# Patient Record
Sex: FEMALE | Race: WHITE | NOT HISPANIC OR LATINO | Employment: UNEMPLOYED | URBAN - NONMETROPOLITAN AREA
[De-identification: names, ages, dates, MRNs, and addresses within clinical notes are randomized per-mention and may not be internally consistent; named-entity substitution may affect disease eponyms.]

---
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
## 2025-03-17 ENCOUNTER — TELEPHONE (OUTPATIENT)
Dept: GASTROENTEROLOGY | Facility: CLINIC | Age: 20
End: 2025-03-17
Payer: COMMERCIAL

## 2025-03-17 NOTE — TELEPHONE ENCOUNTER
Called patient and advised colonoscopy would only rule out crohns.  Alejandrina add on a colon she would like to have a colon with EGD she is going to still have a referral sent over for colon.

## 2025-03-17 NOTE — TELEPHONE ENCOUNTER
PATIENT WANTING TO KNOW IF EGD CAN RULE OUT CHRON'S DISEASE, ALONE, OR DOES IT REQUIRE COLONOSCOPY AS WELL TO RULE IT OUT? ADVISED I WILL HAVE CLINICAL TEAM MEMBER CALL HER TO DISCUSS, AS I AM A  AND CANNOT GIVE CLINICAL ADVISE. PLEASE AND THANK YOU.

## 2025-03-19 ENCOUNTER — TELEPHONE (OUTPATIENT)
Dept: GASTROENTEROLOGY | Facility: CLINIC | Age: 20
End: 2025-03-19

## 2025-03-19 RX ORDER — SODIUM, POTASSIUM,MAG SULFATES 17.5-3.13G
1 SOLUTION, RECONSTITUTED, ORAL ORAL TAKE AS DIRECTED
Qty: 354 ML | Refills: 0 | Status: SHIPPED | OUTPATIENT
Start: 2025-03-19

## 2025-03-19 NOTE — TELEPHONE ENCOUNTER
Provider: DR ENRIQUE GANN     Caller: STEPHANIE MCDOWELL     Relationship to Patient: SELF    Pharmacy: [facility]     Phone Number: [phone]     Reason for Call: PT IS CALLING TO GET HER BOWEL PREP SENT IN TO [facility] FOR HER COLONOSCOPY ON 3/21/25

## 2025-03-21 ENCOUNTER — OUTSIDE FACILITY SERVICE (OUTPATIENT)
Dept: GASTROENTEROLOGY | Facility: CLINIC | Age: 20
End: 2025-03-21
Payer: COMMERCIAL

## 2025-03-21 PROCEDURE — 45385 COLONOSCOPY W/LESION REMOVAL: CPT | Performed by: INTERNAL MEDICINE

## 2025-03-21 PROCEDURE — 88305 TISSUE EXAM BY PATHOLOGIST: CPT | Performed by: INTERNAL MEDICINE

## 2025-03-21 RX ORDER — DICYCLOMINE HYDROCHLORIDE 10 MG/1
10 CAPSULE ORAL
Qty: 120 CAPSULE | Refills: 6 | Status: SHIPPED | OUTPATIENT
Start: 2025-03-21

## 2025-03-24 ENCOUNTER — LAB REQUISITION (OUTPATIENT)
Dept: LAB | Facility: HOSPITAL | Age: 20
End: 2025-03-24
Payer: COMMERCIAL

## 2025-03-24 DIAGNOSIS — K52.9 NONINFECTIVE GASTROENTERITIS AND COLITIS, UNSPECIFIED: ICD-10-CM

## 2025-03-24 DIAGNOSIS — K22.89 OTHER SPECIFIED DISEASE OF ESOPHAGUS: ICD-10-CM

## 2025-03-24 DIAGNOSIS — R10.13 EPIGASTRIC PAIN: ICD-10-CM

## 2025-03-25 LAB
CYTO UR: NORMAL
LAB AP CASE REPORT: NORMAL
LAB AP CLINICAL INFORMATION: NORMAL
LAB AP DIAGNOSIS COMMENT: NORMAL
PATH REPORT.FINAL DX SPEC: NORMAL
PATH REPORT.GROSS SPEC: NORMAL

## 2025-03-27 ENCOUNTER — RESULTS FOLLOW-UP (OUTPATIENT)
Dept: LAB | Facility: HOSPITAL | Age: 20
End: 2025-03-27
Payer: COMMERCIAL

## 2025-03-27 NOTE — LETTER
Shelbygina Gomez New  [address]  [place] [address]    March 31, 2025     Dear Ms. New:    Below are the results from your recent visit:    Resulted Orders   Tissue Pathology Exam   Result Value Ref Range    Case Report       Surgical Pathology Report                         Case: [id number]                                  Authorizing Provider:  Laverne Morel MD           Collected:           03/21/2025 02:23 PM          Ordering Location:     [facility]   Received:            03/24/2025 09:52 AM                                 LABORATORY                                                                   Pathologist:           Jostin Gonzalez MD                                                        Specimens:   1) - Small Intestine, Duodenum                                                                      2) - Gastric, Antrum                                                                                3) - Esophagus                                                                                      4) - Ileum, terminal                                                                                5) - Colon                                                                                 Clinical Information       Epigastric pain  Other specified disease of esophagus  Noninfective gastroenteritis and colitis, unspecified      Final Diagnosis       1.  DUODENUM BIOPSY:  Normal villous architecture with no significant histopathologic change.  No parasites noted.  No villous blunting or increased intraepithelial lymphocytes suggestive of celiac disease.    2.  GASTRIC ANTRUM BIOPSY:  Reactive gastropathic change.  Negative for intestinal metaplasia, dysplasia or significant inflammation.  No Helicobacter pylori like organisms identified on routine stains.    3.  ESOPHAGUS BIOPSY:  Squamous mucosa with basal layer hyperplasia and increased intraepithelial leukocytes including eosinophils (up to  "35 per high-power field).  Negative for intestinal metaplasia, dysplasia and glandular mucosa.  See comment.    4.  TERMINAL ILEUM BIOPSY:  Normal villous architecture with no significant histopathologic change.  No parasites noted.  No villous blunting or increased intraepithelial lymphocytes suggestive of celiac disease.    5.  RANDOM COLON BIOPSY:  Colonic mucosa with no significant histopathologic change.  No histologic features of acute colitis, chronic colitis, collagenous colitis or lymphocytic colitis identified.        Comment       The degree of eosinophilia raises the possibility of eosinophilic esophagitis although exuberant reflux could show similar features.  Correlation with clinical and endoscopic findings would be essential.      Gross Description       1. Small Intestine, Duodenum.  Received in formalin labeled \"duodenum\" are multiple tan soft tissue fragments aggregating 1.2 x 1 x 0.2 cm submitted entirely in a single cassette.    2. Gastric, Antrum.  Received in formalin labeled \"gastric antrum\" are 2 tan soft tissue fragments aggregating 0.4 x 0.4 x 0.2 cm submitted entirely in a single cassette.    3. Esophagus.  Received in formalin labeled \"esophagus\" are multiple white, ragged soft tissue fragments aggregating 1 x 1 x 0.2 cm submitted entirely in a single cassette.    4. Ileum, terminal.  Received in formalin labeled \"terminal ileum\" are a few tan soft tissue fragments aggregating 0.6 x 0.6 x 0.2 cm submitted entirely in a single cassette.    5. Colon.  Received in formalin labeled \"random colon\" are multiple tan soft tissue fragments aggregating 1 x 1 x 0.2 cm submitted entirely in a single cassette. HDM        Microscopic Description       The slides are reviewed and demonstrate histopathologic features supporting the above rendered diagnosis.           , You're pathology of her duodenum, antrum, esophagus, terminal ileum, and random colon did NOT show any specific disease.   This is " good news.  I recommend repeat colonoscopy at age 45.             Sincerely,        Laverne Morel MD

## 2025-06-24 ENCOUNTER — OFFICE VISIT (OUTPATIENT)
Dept: OBSTETRICS AND GYNECOLOGY | Facility: CLINIC | Age: 20
End: 2025-06-24
Payer: COMMERCIAL

## 2025-06-24 VITALS — WEIGHT: 148 LBS | RESPIRATION RATE: 14 BRPM | BODY MASS INDEX: 25.4 KG/M2

## 2025-06-24 DIAGNOSIS — N91.4 SECONDARY OLIGOMENORRHEA: Primary | ICD-10-CM

## 2025-06-24 DIAGNOSIS — R10.31 PAIN, ABDOMINAL, RLQ: ICD-10-CM

## 2025-06-24 DIAGNOSIS — R10.32 ABDOMINAL PAIN, LLQ: ICD-10-CM

## 2025-06-24 PROBLEM — S83.519A RUPTURE OF ANTERIOR CRUCIATE LIGAMENT: Status: RESOLVED | Noted: 2025-05-01 | Resolved: 2025-06-24

## 2025-06-24 PROBLEM — S83.519A RUPTURE OF ANTERIOR CRUCIATE LIGAMENT: Status: ACTIVE | Noted: 2025-01-08

## 2025-06-24 PROBLEM — K58.9 IRRITABLE BOWEL SYNDROME: Status: ACTIVE | Noted: 2022-01-01

## 2025-06-24 PROBLEM — S83.519A RUPTURE OF ANTERIOR CRUCIATE LIGAMENT: Status: ACTIVE | Noted: 2025-05-01

## 2025-06-24 PROCEDURE — 99214 OFFICE O/P EST MOD 30 MIN: CPT | Performed by: OBSTETRICS & GYNECOLOGY

## 2025-06-24 RX ORDER — SERTRALINE HYDROCHLORIDE 25 MG/1
TABLET, FILM COATED ORAL
COMMUNITY
End: 2025-06-24

## 2025-06-24 NOTE — PROGRESS NOTES
Subjective   Chief Complaint   Patient presents with    Menstrual Problem       Shelby Gudino is a 20 y.o. year old .  Patient's last menstrual period was 2025 (exact date).  She comes back in in follow-up.  When she was here most recently we did try her on her birth control pill.  She did not like the way it made her feel.  She has been off it for about 4 months now.  She is not having menstrual cycles.  She also is having pain in the lower quadrants bilaterally wrapping around to the back.  Has random cramps that are not real bad but the pain is worse when she menstruates.  She has had a longstanding history of alternating diarrhea and constipation dating back multiple years.  She has had a colonoscopy and they found nothing.    She has never had a transvaginal ultrasound to look into this.  She has been sexually active in the past but has not been sexually active for at least 1 year now.    The following portions of the patient's history were reviewed and updated as appropriate:current medications and allergies    Social History    Tobacco Use      Smoking status: Never      Smokeless tobacco: Never         Objective   Resp 14   Wt 67.1 kg (148 lb)   LMP 2025 (Exact Date)   BMI 25.40 kg/m²     Lab Review   No data reviewed    Imaging   No data reviewed        Assessment   Oligomenorrhea with dysmenorrhea impacting day-to-day function.  Bowel dysfunction which could potentially be explaining her pelvic pain     Plan   Ultrasound needs to be done any time that is convenient for her.   The importance of keeping all planned follow-up and taking all medications as prescribed was emphasized.  Follow up after ultrasound           This note was electronically signed.    Jaxson Patel M.D.  2025        Part of this note may be an electronic transcription/translation of spoken language to printed text using the Dragon Dictation System.

## 2025-06-24 NOTE — PROGRESS NOTES
Subjective   Chief Complaint   Patient presents with    Menstrual Problem     Shelby Gudino is a 20 y.o. year old  who comes to review her recent testing and discuss next steps.         Objective   Resp 14   Wt 67.1 kg (148 lb)   LMP 2025 (Exact Date)   BMI 25.40 kg/m²     Lab Review   No data reviewed    Imaging   Pelvic ultrasound images independantly reviewed - uterus is small and normal in size and shape.  Ovaries have a polycystic appearance bilaterally       Assessment   Pelvic pain of unclear etiology.  No sonographic explanation for her pain  Irregular menses with sonographic appearing polycystic ovaries bilaterally     Plan   I really think next step would be to see GI and consider food allergy testing to look into the causes for her pain.  I have offered laparoscopy to exclude endometriosis but I think given her age I think this is not likely to be present.  If the pain persists after seeing GI and doing a more thorough workup, would consider laparoscopy to evaluate for endometriosis  The importance of keeping all planned follow-up and taking all medications as prescribed was emphasized.  Follow up pending evaluation with GI     No orders of the defined types were placed in this encounter.             This note was electronically signed.    Jaxson Patel M.D.  2025    Part of this note may be an electronic transcription/translation of spoken language to printed text using the Dragon Dictation System.

## 2025-06-24 NOTE — Clinical Note
Laverne, She is having pelvic pain.  She has a normal ultrasound.  Her history is not highly suggestive of a gynecologic cause.  Her mother did have endometriosis at a young age so I cannot exclude that being present but I really think we need to try to get a better handle on her alternating diarrhea and constipation.  She is going to be seeing you in about 3 weeks time.  I told her I thought we needed to see if we can improve her bowel symptoms and see if her pelvic pain improves.

## 2025-08-12 ENCOUNTER — OFFICE VISIT (OUTPATIENT)
Age: 20
End: 2025-08-12
Payer: COMMERCIAL

## 2025-08-12 ENCOUNTER — LAB (OUTPATIENT)
Facility: HOSPITAL | Age: 20
End: 2025-08-12
Payer: COMMERCIAL

## 2025-08-12 VITALS
DIASTOLIC BLOOD PRESSURE: 68 MMHG | OXYGEN SATURATION: 98 % | WEIGHT: 144 LBS | HEIGHT: 66 IN | SYSTOLIC BLOOD PRESSURE: 98 MMHG | BODY MASS INDEX: 23.14 KG/M2 | HEART RATE: 63 BPM

## 2025-08-12 DIAGNOSIS — R10.13 EPIGASTRIC PAIN: Primary | ICD-10-CM

## 2025-08-12 DIAGNOSIS — R10.13 EPIGASTRIC PAIN: ICD-10-CM

## 2025-08-12 DIAGNOSIS — R19.7 DIARRHEA, UNSPECIFIED TYPE: ICD-10-CM

## 2025-08-12 DIAGNOSIS — R19.8 ALTERNATING CONSTIPATION AND DIARRHEA: ICD-10-CM

## 2025-08-12 DIAGNOSIS — R74.8 ELEVATED ALKALINE PHOSPHATASE LEVEL: ICD-10-CM

## 2025-08-12 LAB
ADV 40+41 DNA STL QL NAA+NON-PROBE: NOT DETECTED
ASTRO TYP 1-8 RNA STL QL NAA+NON-PROBE: NOT DETECTED
BASOPHILS # BLD AUTO: 0.08 10*3/MM3 (ref 0–0.2)
BASOPHILS NFR BLD AUTO: 1.1 % (ref 0–1.5)
C CAYETANENSIS DNA STL QL NAA+NON-PROBE: NOT DETECTED
C COLI+JEJ+UPSA DNA STL QL NAA+NON-PROBE: NOT DETECTED
C DIFF TOX GENS STL QL NAA+PROBE: NOT DETECTED
CRYPTOSP DNA STL QL NAA+NON-PROBE: NOT DETECTED
DEPRECATED RDW RBC AUTO: 40.5 FL (ref 37–54)
E HISTOLYT DNA STL QL NAA+NON-PROBE: NOT DETECTED
EAEC PAA PLAS AGGR+AATA ST NAA+NON-PRB: NOT DETECTED
EC STX1+STX2 GENES STL QL NAA+NON-PROBE: NOT DETECTED
EOSINOPHIL # BLD AUTO: 0.4 10*3/MM3 (ref 0–0.4)
EOSINOPHIL NFR BLD AUTO: 5.3 % (ref 0.3–6.2)
EPEC EAE GENE STL QL NAA+NON-PROBE: NOT DETECTED
ERYTHROCYTE [DISTWIDTH] IN BLOOD BY AUTOMATED COUNT: 11.8 % (ref 12.3–15.4)
ERYTHROCYTE [SEDIMENTATION RATE] IN BLOOD: 7 MM/HR (ref 0–20)
ETEC LTA+ST1A+ST1B TOX ST NAA+NON-PROBE: NOT DETECTED
G LAMBLIA DNA STL QL NAA+NON-PROBE: NOT DETECTED
HCT VFR BLD AUTO: 42.5 % (ref 34–46.6)
HGB BLD-MCNC: 14.3 G/DL (ref 12–15.9)
IMM GRANULOCYTES # BLD AUTO: 0.01 10*3/MM3 (ref 0–0.05)
IMM GRANULOCYTES NFR BLD AUTO: 0.1 % (ref 0–0.5)
LYMPHOCYTES # BLD AUTO: 2.97 10*3/MM3 (ref 0.7–3.1)
LYMPHOCYTES NFR BLD AUTO: 39.5 % (ref 19.6–45.3)
MCH RBC QN AUTO: 31.5 PG (ref 26.6–33)
MCHC RBC AUTO-ENTMCNC: 33.6 G/DL (ref 31.5–35.7)
MCV RBC AUTO: 93.6 FL (ref 79–97)
MONOCYTES # BLD AUTO: 0.43 10*3/MM3 (ref 0.1–0.9)
MONOCYTES NFR BLD AUTO: 5.7 % (ref 5–12)
NEUTROPHILS NFR BLD AUTO: 3.62 10*3/MM3 (ref 1.7–7)
NEUTROPHILS NFR BLD AUTO: 48.3 % (ref 42.7–76)
NOROVIRUS GI+II RNA STL QL NAA+NON-PROBE: NOT DETECTED
NRBC BLD AUTO-RTO: 0 /100 WBC (ref 0–0.2)
P SHIGELLOIDES DNA STL QL NAA+NON-PROBE: NOT DETECTED
PLATELET # BLD AUTO: 372 10*3/MM3 (ref 140–450)
PMV BLD AUTO: 10 FL (ref 6–12)
RBC # BLD AUTO: 4.54 10*6/MM3 (ref 3.77–5.28)
RVA RNA STL QL NAA+NON-PROBE: NOT DETECTED
S ENT+BONG DNA STL QL NAA+NON-PROBE: NOT DETECTED
SAPO I+II+IV+V RNA STL QL NAA+NON-PROBE: NOT DETECTED
SHIGELLA SP+EIEC IPAH ST NAA+NON-PROBE: NOT DETECTED
V CHOL+PARA+VUL DNA STL QL NAA+NON-PROBE: NOT DETECTED
V CHOLERAE DNA STL QL NAA+NON-PROBE: NOT DETECTED
WBC NRBC COR # BLD AUTO: 7.51 10*3/MM3 (ref 3.4–10.8)
Y ENTEROCOL DNA STL QL NAA+NON-PROBE: NOT DETECTED

## 2025-08-12 PROCEDURE — 84080 ASSAY ALKALINE PHOSPHATASES: CPT

## 2025-08-12 PROCEDURE — 82784 ASSAY IGA/IGD/IGG/IGM EACH: CPT

## 2025-08-12 PROCEDURE — 85652 RBC SED RATE AUTOMATED: CPT

## 2025-08-12 PROCEDURE — 86258 DGP ANTIBODY EACH IG CLASS: CPT

## 2025-08-12 PROCEDURE — 84075 ASSAY ALKALINE PHOSPHATASE: CPT

## 2025-08-12 PROCEDURE — 86140 C-REACTIVE PROTEIN: CPT

## 2025-08-12 PROCEDURE — 87493 C DIFF AMPLIFIED PROBE: CPT

## 2025-08-12 PROCEDURE — 87209 SMEAR COMPLEX STAIN: CPT

## 2025-08-12 PROCEDURE — 80050 GENERAL HEALTH PANEL: CPT

## 2025-08-12 PROCEDURE — 99214 OFFICE O/P EST MOD 30 MIN: CPT | Performed by: NURSE PRACTITIONER

## 2025-08-12 PROCEDURE — 82977 ASSAY OF GGT: CPT

## 2025-08-12 PROCEDURE — 87177 OVA AND PARASITES SMEARS: CPT

## 2025-08-12 PROCEDURE — 36415 COLL VENOUS BLD VENIPUNCTURE: CPT

## 2025-08-12 PROCEDURE — 87507 IADNA-DNA/RNA PROBE TQ 12-25: CPT

## 2025-08-12 PROCEDURE — 86364 TISS TRNSGLTMNASE EA IG CLAS: CPT

## 2025-08-12 RX ORDER — DICYCLOMINE HYDROCHLORIDE 10 MG/1
10 CAPSULE ORAL
COMMUNITY

## 2025-08-13 ENCOUNTER — LAB (OUTPATIENT)
Dept: LAB | Facility: HOSPITAL | Age: 20
End: 2025-08-13
Payer: COMMERCIAL

## 2025-08-13 DIAGNOSIS — R10.13 EPIGASTRIC PAIN: ICD-10-CM

## 2025-08-13 DIAGNOSIS — R19.8 ALTERNATING CONSTIPATION AND DIARRHEA: ICD-10-CM

## 2025-08-13 DIAGNOSIS — R19.7 DIARRHEA, UNSPECIFIED TYPE: ICD-10-CM

## 2025-08-13 LAB
ALBUMIN SERPL-MCNC: 4.7 G/DL (ref 3.5–5.2)
ALBUMIN/GLOB SERPL: 1.7 G/DL
ALP SERPL-CCNC: 82 U/L (ref 39–117)
ALT SERPL W P-5'-P-CCNC: 15 U/L (ref 1–33)
ANION GAP SERPL CALCULATED.3IONS-SCNC: 12 MMOL/L (ref 5–15)
AST SERPL-CCNC: 20 U/L (ref 1–32)
BILIRUB SERPL-MCNC: 0.4 MG/DL (ref 0–1.2)
BUN SERPL-MCNC: 14 MG/DL (ref 6–20)
BUN/CREAT SERPL: 18.7 (ref 7–25)
CALCIUM SPEC-SCNC: 9.8 MG/DL (ref 8.6–10.5)
CHLORIDE SERPL-SCNC: 104 MMOL/L (ref 98–107)
CO2 SERPL-SCNC: 23 MMOL/L (ref 22–29)
CREAT SERPL-MCNC: 0.75 MG/DL (ref 0.57–1)
CRP SERPL-MCNC: <0.3 MG/DL (ref 0–0.5)
EGFRCR SERPLBLD CKD-EPI 2021: 117.1 ML/MIN/1.73
GGT SERPL-CCNC: 12 U/L (ref 5–36)
GLOBULIN UR ELPH-MCNC: 2.8 GM/DL
GLUCOSE SERPL-MCNC: 75 MG/DL (ref 65–99)
LACTOFERRIN STL QL LA: NEGATIVE
POTASSIUM SERPL-SCNC: 4.2 MMOL/L (ref 3.5–5.2)
PROT SERPL-MCNC: 7.5 G/DL (ref 6–8.5)
SODIUM SERPL-SCNC: 139 MMOL/L (ref 136–145)
TSH SERPL DL<=0.05 MIU/L-ACNC: 0.71 UIU/ML (ref 0.27–4.2)

## 2025-08-13 PROCEDURE — 83630 LACTOFERRIN FECAL (QUAL): CPT

## 2025-08-13 PROCEDURE — 83993 ASSAY FOR CALPROTECTIN FECAL: CPT

## 2025-08-14 LAB
ALP BONE CFR SERPL: 44 % (ref 14–68)
ALP INTEST CFR SERPL: 0 % (ref 0–18)
ALP LIVER CFR SERPL: 56 % (ref 18–85)
ALP SERPL-CCNC: 85 IU/L (ref 42–106)
GLIADIN PEPTIDE IGA SER-ACNC: 3 UNITS (ref 0–19)
GLIADIN PEPTIDE IGG SER-ACNC: 4 UNITS (ref 0–19)
IGA SERPL-MCNC: 183 MG/DL (ref 87–352)
TTG IGA SER-ACNC: <2 U/ML (ref 0–3)
TTG IGG SER-ACNC: 8 U/ML (ref 0–5)

## 2025-08-17 LAB
O+P SPEC MICRO: NORMAL
O+P STL TRI STN: NORMAL

## 2025-08-18 LAB — CALPROTECTIN STL-MCNT: 16 UG/G (ref 0–120)
